# Patient Record
(demographics unavailable — no encounter records)

---

## 2024-11-19 NOTE — HISTORY OF PRESENT ILLNESS
[de-identified] : This 53-year-old right-handed healthy young lady seen today for evaluation of a 2-3-month history of insidious onset of pain and stiffness of the right shoulder.  No obvious history of traumatic or precipitating event she does have discomfort with positioning of her arm out in space above head.  She has had some mild neck discomfort however it does not radiate.  Past medical history is noncontributory.  Medications include lorazepam Prilosec's and Trintellix no allergies

## 2024-11-19 NOTE — ASSESSMENT
[FreeTextEntry1] : Impression: Impingement syndrome right shoulder.  I have injected the subacromial space without incident.  She has been placed on Mobic with GI precautions and referred for formal physical therapy.  She will return to the therapy if she continues to be symptomatic

## 2024-11-19 NOTE — PHYSICAL EXAM
[de-identified] : Exam today reveals normal motion to the cervical spine without spasm or tenderness.  The right shoulder has an obvious arc of impingement with mild to moderate restriction of full abduction and forward flexion in the scapular plane restricted rotation more so external because of pain.  There is no crepitus on motion no popping clicking on either active or passive circumduction strength is acceptable she is painful in the subacromial space less so to the greater tuberosity.  Neurovascular status is intact.  X-rays ordered and taken today of the right shoulder reveal minimal degenerative change

## 2025-01-08 NOTE — ASSESSMENT
[FreeTextEntry1] : Impression: Adhesive capsulitis right shoulder cervical radiculitis.  I have injected the subacromial space and glenohumeral joint of the right shoulder uneventfully.  She will stay out of work for 1 week because of her severe pain and inability to work at her desk with the arm extended out in space.  I have replaced the Mobic with Celebrex 1 tablet daily PC.  I have asked that she call her insurance company with regards to coming to resolution with her physical therapy sessions which is crucial for this patient to have as part of her treatment regimen.  I will see her in 3 weeks time

## 2025-01-08 NOTE — HISTORY OF PRESENT ILLNESS
[de-identified] : This 53-year-old returns she has continued to have significant dysfunction with regards to her right shoulder.  She is also having pain that goes down to the fingers more so on the right side there is discomfort in her neck as well.  She did go to physical therapy a few times but had to stop because she has a huge deductible and it was too costly to continue.  She was given an exercise program to perform at home which she has been doing.  Her stiffness and pain has progressed since she was last seen it is to be noted she was unable to continue with Mobic after 5 days as it was irritating her stomach.

## 2025-01-08 NOTE — PHYSICAL EXAM
[de-identified] : Examination today reveals she has reasonable motion to the cervical spine there is pain however full extension and flexion as well as rotation to the right.  Spasm and tenderness is noted on both sides of the subaxial region more so on the right side no obvious trigger points present.  With regards to the shoulder she has significant restriction of motion in all planes especially rotation where she has pain in the subacromial space diffusely.  Positive Neer and Ivy her strength is restricted at least on the basis of pain and stiffness.  Exam certainly felt to be consistent with adhesive capsulitis.  She is neurologically intact there is no instability on stress.  X-rays ordered and taken today 3 views of the cervical spine AP lateral open-mouth views which reveal significant multilevel degenerative disc space narrowing in the subaxial region no lesion mild facet arthritis and spondylosis noted

## 2025-01-08 NOTE — PROCEDURE
[FreeTextEntry1] : Under sterile technique with a Betadine prep the subacromial space and glenohumeral joint of the right shoulder was injected with 40 mg of methylprednisolone and 5 cc of 1% lidocaine with a Band-Aid dressing applied at the conclusion this was tolerated without incident

## 2025-01-29 NOTE — PHYSICAL EXAM
[de-identified] : Her examination today reveals she has better motion to the cervical spine with less spasm and tenderness no triggering the right shoulder again demonstrate significant restriction of motion in all planes consistent with adhesive capsulitis.  Passive motion is not much better as well.  She is neurologically stable.

## 2025-01-29 NOTE — HISTORY OF PRESENT ILLNESS
[de-identified] : This 53-year-old returns for follow-up of her neck and right shoulder.  Since last seen she was able to return to physical therapy at another facility that is in her plan.  She is happy with this however she continues to have ongoing pain to the neck and more so pain in the shoulder with stiffness and restricted motion.  She states that the cortisone injection into the shoulder was helpful for approximately 10 days time.  She does continue to have tingling that goes down into her hand and fingers.  With regards to medications she does have a history of gastritis.  While Celebrex did help for it did cause some GI upset.

## 2025-01-29 NOTE — PROCEDURE
[FreeTextEntry1] : Under sterile technique with a Betadine prep the subacromial space and glenohumeral joint of the right shoulder was injected with 40 mg of methylprednisolone and 5 cc of 1% lidocaine with a Band-Aid dressing applied at the conclusion this was tolerated without incident.  An appropriate timeout and consent was performed prior to the injection

## 2025-01-29 NOTE — ASSESSMENT
[FreeTextEntry1] : Impression: Adhesive capsulitis right shoulder rule out rotator cuff tear, cervical radiculitis.  I have injected the shoulder once again.  She will continue with physical therapy.  I have ordered MRI examination of the shoulder.  Of asked her to speak with her internist with regards to possible medications for her GI tract such that she can potentially continue with Celebrex that does help her.  She will return in 4 weeks.  I will be in contact with her if the MRI is done prior to this next visit.  Patient has been made aware should the MRI not be revealing for significant rotator cuff disease she will continue with conservative management injections and physical therapy.  The unpredictability of adhesive capsulitis with regards to duration of the period of disability and response to treatment is frequently unpredictable.

## 2025-02-28 NOTE — PHYSICAL EXAM
[de-identified] : Her exam reveals improved motion though still significant limitation above the horizontal plane and restricted rotation mainly internal.  Tender in the subacromial space positive Neer and Ivy.  The neck is moving quite well with much less spasm and tenderness.

## 2025-02-28 NOTE — ASSESSMENT
[FreeTextEntry1] : Impression: Adhesive capsulitis right shoulder/impingement.  Cervical radiculitis improving.  5 injected the glenohumeral joint once again continue with physical therapy along with as needed Celebrex.  She will return in 5 weeks for follow-up

## 2025-02-28 NOTE — PROCEDURE
[FreeTextEntry1] :  following appropriate timeout and consent.  The right shoulder after a sterile Betadine prep was injected into the glenohumeral joint with 40 mg of methylprednisolone and 3 cc of 1% lidocaine with a Band-Aid dressing applied at the conclusion this was tolerated without incident

## 2025-02-28 NOTE — HISTORY OF PRESENT ILLNESS
[de-identified] : This 53-year-old returns for follow-up she is starting to show improvement with regards to her shoulder motion and has less discomfort